# Patient Record
Sex: FEMALE | NOT HISPANIC OR LATINO
[De-identification: names, ages, dates, MRNs, and addresses within clinical notes are randomized per-mention and may not be internally consistent; named-entity substitution may affect disease eponyms.]

---

## 2017-01-30 ENCOUNTER — RECORDS - HEALTHEAST (OUTPATIENT)
Dept: ADMINISTRATIVE | Facility: OTHER | Age: 47
End: 2017-01-30

## 2017-01-30 ENCOUNTER — HOSPITAL ENCOUNTER (EMERGENCY)
Facility: CLINIC | Age: 47
Discharge: HOME | End: 2017-01-30
Attending: PSYCHIATRY & NEUROLOGY | Admitting: PSYCHIATRY & NEUROLOGY
Payer: COMMERCIAL

## 2017-01-30 VITALS
HEART RATE: 68 BPM | SYSTOLIC BLOOD PRESSURE: 106 MMHG | BODY MASS INDEX: 25.82 KG/M2 | RESPIRATION RATE: 16 BRPM | WEIGHT: 132.19 LBS | TEMPERATURE: 97 F | DIASTOLIC BLOOD PRESSURE: 49 MMHG | OXYGEN SATURATION: 99 %

## 2017-01-30 DIAGNOSIS — F42.9 OCD (OBSESSIVE COMPULSIVE DISORDER): ICD-10-CM

## 2017-01-30 LAB
AMPHETAMINES UR QL SCN: NORMAL
BARBITURATES UR QL: NORMAL
BENZODIAZ UR QL: NORMAL
CANNABINOIDS UR QL SCN: NORMAL
COCAINE UR QL: NORMAL
ETHANOL UR QL SCN: NORMAL
HCG UR QL: NEGATIVE
OPIATES UR QL SCN: NORMAL

## 2017-01-30 PROCEDURE — 99284 EMERGENCY DEPT VISIT MOD MDM: CPT | Mod: Z6 | Performed by: PSYCHIATRY & NEUROLOGY

## 2017-01-30 PROCEDURE — 81025 URINE PREGNANCY TEST: CPT | Performed by: EMERGENCY MEDICINE

## 2017-01-30 PROCEDURE — 80307 DRUG TEST PRSMV CHEM ANLYZR: CPT | Performed by: EMERGENCY MEDICINE

## 2017-01-30 PROCEDURE — 90791 PSYCH DIAGNOSTIC EVALUATION: CPT

## 2017-01-30 PROCEDURE — 80320 DRUG SCREEN QUANTALCOHOLS: CPT | Performed by: EMERGENCY MEDICINE

## 2017-01-30 PROCEDURE — 99285 EMERGENCY DEPT VISIT HI MDM: CPT | Mod: 25 | Performed by: PSYCHIATRY & NEUROLOGY

## 2017-01-30 ASSESSMENT — ENCOUNTER SYMPTOMS
HALLUCINATIONS: 0
DYSPHORIC MOOD: 0
CHEST TIGHTNESS: 0
BACK PAIN: 0
FEVER: 0
ABDOMINAL PAIN: 0
SHORTNESS OF BREATH: 0
NERVOUS/ANXIOUS: 1
DIZZINESS: 0

## 2017-01-30 NOTE — ED PROVIDER NOTES
"  History     Chief Complaint   Patient presents with     Suicidal     States she saw theraqpist today.  Had suicidal ideation over the weekend.  States she is not feeling this way currently but is unable to say she will not start feeling this way.      The history is provided by the patient and medical records.     Xena Ribera is a 46 year old female who comes in due to her therapist telling her she should get evaluated due to her having suicidal thoughts over the weekend.  She only had passive thoughts never with any plans or intent.  There were several factors including her menstrual cycle starting and stress about her thesis paper.  She has working hard on her new diagnosis of OCD.  She has been in therapy for it since December and this weekend all of this just \"hit me.\"  She is not suicidal currently.  She states she has good friend and family support including her .  She is not interested in medications.    Please see the 's assessment for further details.    I have reviewed the Medications, Allergies, Past Medical and Surgical History, and Social History in the Epic system.    Review of Systems   Constitutional: Negative for fever.   Eyes: Negative for visual disturbance.   Respiratory: Negative for chest tightness and shortness of breath.    Cardiovascular: Negative for chest pain.   Gastrointestinal: Negative for abdominal pain.   Musculoskeletal: Negative for back pain.   Neurological: Negative for dizziness.   Psychiatric/Behavioral: Negative for suicidal ideas (had thoughts over the weekend), hallucinations, self-injury and dysphoric mood. The patient is nervous/anxious.    All other systems reviewed and are negative.      Physical Exam   BP: 137/57 mmHg  Pulse: 71  Temp: 98.2  F (36.8  C)  Resp: 14  Weight: 59.96 kg (132 lb 3 oz)  SpO2: 99 %  Physical Exam   Constitutional: She is oriented to person, place, and time. She appears well-developed and well-nourished.   HENT:   Head: " Normocephalic and atraumatic.   Mouth/Throat: Oropharynx is clear and moist.   Eyes: Pupils are equal, round, and reactive to light.   Neck: Normal range of motion. Neck supple.   Cardiovascular: Normal rate, regular rhythm and normal heart sounds.    Pulmonary/Chest: Effort normal and breath sounds normal.   Abdominal: Soft. Bowel sounds are normal.   Musculoskeletal: Normal range of motion.   Neurological: She is alert and oriented to person, place, and time.   Skin: Skin is warm and dry.   Psychiatric: She has a normal mood and affect. Her speech is normal and behavior is normal. Judgment and thought content normal. She is not actively hallucinating. Thought content is not paranoid and not delusional. Cognition and memory are normal. She expresses no homicidal and no suicidal ideation. She expresses no suicidal plans and no homicidal plans.   Xena is a 45 y/o female who looks her age.  She is well groomed with good eye contact.   Nursing note and vitals reviewed.      ED Course     Procedures               Labs Ordered and Resulted from Time of ED Arrival Up to the Time of Departure from the ED   DRUG ABUSE SCREEN 6 CHEM DEP URINE ([facility])   HCG QUALITATIVE URINE       Assessments & Plan (with Medical Decision Making)   Xena will be discharged home.  She is not an imminent risk to herself or others.  She will follow up with her therapist.  She was given resources on [employer] and a [facility] OCD support group.  She was also given the info on [facility] crisis team.    I have reviewed the nursing notes.    I have reviewed the findings, diagnosis, plan and need for follow up with the patient.    New Prescriptions    No medications on file       Final diagnoses:   OCD (obsessive compulsive disorder)       1/30/2017   [facility] [place], EMERGENCY DEPARTMENT      Russ Daugherty MD  01/30/17 [id number]

## 2017-01-30 NOTE — ED AVS SNAPSHOT
[facility], [place], Emergency Department    [address]    [facility] [address]    Phone:  [phone]    Fax:  [phone]                                       Xena Ribera   MRN: [id number]    Department:  [facility], Emergency Department   Date of Visit:  1/30/2017           After Visit Summary Signature Page     I have received my discharge instructions, and my questions have been answered. I have discussed any challenges I see with this plan with the nurse or doctor.    ..........................................................................................................................................  Patient/Patient Representative Signature      ..........................................................................................................................................  Patient Representative Print Name and Relationship to Patient    ..................................................               ................................................  Date                                            Time    ..........................................................................................................................................  Reviewed by Signature/Title    ...................................................              ..............................................  Date                                                            Time

## 2017-01-30 NOTE — ED AVS SNAPSHOT
[facility], Emergency Department    [address]    [place] [address]    Phone:  [phone]    Fax:  [phone]                                       Xena Ribera   MRN: [id number]    Department:  [facility], Emergency Department   Date of Visit:  1/30/2017           Patient Information     Date Of Birth          1970        Your diagnoses for this visit were:     OCD (obsessive compulsive disorder)        You were seen by Russ Daugherty MD.        Discharge Instructions       Follow up with your therapist    Use the resources given for further support as needed    24 Hour Appointment Hotline       To make an appointment at any [facility], call [phone] ([phone]). If you don't have a family doctor or clinic, we will help you find one. [facility] clinics are conveniently located to serve the needs of you and your family.             Review of your medicines      Notice     You have not been prescribed any medications.            Procedures and tests performed during your visit     Drug abuse screen 6 urine (tox)    HCG qualitative urine      Orders Needing Specimen Collection     None      Pending Results     No orders found from 1/29/2017 to 1/31/2017.            Pending Culture Results     No orders found from 1/29/2017 to 1/31/2017.            Thank you for choosing [facility]       Thank you for choosing [facility] for your care. Our goal is always to provide you with excellent care. Hearing back from our patients is one way we can continue to improve our services. Please take a few minutes to complete the written survey that you may receive in the mail after you visit with us. Thank you!        [employer] Information     [employer] gives you secure access to your electronic health record. If you see a primary care provider, you can also send messages to your care team and make appointments. If you have questions, please call your primary care clinic.  If you do not have a  primary care provider, please call [phone] and they will assist you.        Care EveryWhere ID     This is your Care EveryWhere ID. This could be used by other organizations to access your [place] medical records  [id number]        After Visit Summary       This is your record. Keep this with you and show to your community pharmacist(s) and doctor(s) at your next visit.

## 2017-04-06 ENCOUNTER — COMMUNICATION - HEALTHEAST (OUTPATIENT)
Dept: FAMILY MEDICINE | Facility: CLINIC | Age: 47
End: 2017-04-06

## 2017-04-28 ENCOUNTER — OFFICE VISIT - HEALTHEAST (OUTPATIENT)
Dept: FAMILY MEDICINE | Facility: CLINIC | Age: 47
End: 2017-04-28

## 2017-04-28 DIAGNOSIS — M54.2 NECK PAIN: ICD-10-CM

## 2017-04-28 DIAGNOSIS — R06.02 SHORTNESS OF BREATH: ICD-10-CM

## 2017-04-28 ASSESSMENT — MIFFLIN-ST. JEOR: SCORE: 1144.89

## 2017-07-15 ENCOUNTER — HEALTH MAINTENANCE LETTER (OUTPATIENT)
Age: 47
End: 2017-07-15

## 2018-02-12 ENCOUNTER — COMMUNICATION - HEALTHEAST (OUTPATIENT)
Dept: TELEHEALTH | Facility: CLINIC | Age: 48
End: 2018-02-12

## 2018-02-12 ENCOUNTER — OFFICE VISIT - HEALTHEAST (OUTPATIENT)
Dept: FAMILY MEDICINE | Facility: CLINIC | Age: 48
End: 2018-02-12

## 2018-02-12 DIAGNOSIS — B37.31 YEAST VAGINITIS: ICD-10-CM

## 2018-02-12 LAB
CLUE CELLS: ABNORMAL
TRICHOMONAS, WET PREP: ABNORMAL
YEAST, WET PREP: ABNORMAL

## 2018-02-14 ENCOUNTER — COMMUNICATION - HEALTHEAST (OUTPATIENT)
Dept: SCHEDULING | Facility: CLINIC | Age: 48
End: 2018-02-14

## 2018-08-07 ENCOUNTER — OFFICE VISIT - HEALTHEAST (OUTPATIENT)
Dept: OTOLARYNGOLOGY | Facility: CLINIC | Age: 48
End: 2018-08-07

## 2018-08-07 DIAGNOSIS — J34.89 NASAL VESTIBULITIS: ICD-10-CM

## 2018-08-24 ENCOUNTER — COMMUNICATION - HEALTHEAST (OUTPATIENT)
Dept: FAMILY MEDICINE | Facility: CLINIC | Age: 48
End: 2018-08-24

## 2018-08-24 DIAGNOSIS — N84.1 POLYP OF CERVIX: ICD-10-CM

## 2018-09-10 ENCOUNTER — COMMUNICATION - HEALTHEAST (OUTPATIENT)
Dept: ADMINISTRATIVE | Facility: CLINIC | Age: 48
End: 2018-09-10

## 2018-09-13 ENCOUNTER — OFFICE VISIT - HEALTHEAST (OUTPATIENT)
Dept: OBGYN | Facility: CLINIC | Age: 48
End: 2018-09-13

## 2018-09-13 DIAGNOSIS — N84.1 POLYP, CERVIX: ICD-10-CM

## 2018-09-13 ASSESSMENT — MIFFLIN-ST. JEOR: SCORE: 1181.17

## 2018-09-17 LAB
LAB AP CHARGES (HE HISTORICAL CONVERSION): NORMAL
PATH REPORT.COMMENTS IMP SPEC: NORMAL
PATH REPORT.FINAL DX SPEC: NORMAL
PATH REPORT.GROSS SPEC: NORMAL
PATH REPORT.MICROSCOPIC SPEC OTHER STN: NORMAL
PATH REPORT.RELEVANT HX SPEC: NORMAL
RESULT FLAG (HE HISTORICAL CONVERSION): NORMAL

## 2019-11-03 ENCOUNTER — HEALTH MAINTENANCE LETTER (OUTPATIENT)
Age: 49
End: 2019-11-03

## 2019-12-04 ENCOUNTER — COMMUNICATION - HEALTHEAST (OUTPATIENT)
Dept: FAMILY MEDICINE | Facility: CLINIC | Age: 49
End: 2019-12-04

## 2019-12-13 ENCOUNTER — OFFICE VISIT - HEALTHEAST (OUTPATIENT)
Dept: FAMILY MEDICINE | Facility: CLINIC | Age: 49
End: 2019-12-13

## 2019-12-13 DIAGNOSIS — E55.9 VITAMIN D DEFICIENCY: ICD-10-CM

## 2019-12-13 DIAGNOSIS — M25.50 ARTHRALGIA, UNSPECIFIED JOINT: ICD-10-CM

## 2019-12-13 DIAGNOSIS — Z12.11 SCREEN FOR COLON CANCER: ICD-10-CM

## 2019-12-13 DIAGNOSIS — Z12.31 VISIT FOR SCREENING MAMMOGRAM: ICD-10-CM

## 2019-12-13 LAB
ALBUMIN SERPL-MCNC: 3.8 G/DL (ref 3.5–5)
ALP SERPL-CCNC: 89 U/L (ref 45–120)
ALT SERPL W P-5'-P-CCNC: 13 U/L (ref 0–45)
ANION GAP SERPL CALCULATED.3IONS-SCNC: 11 MMOL/L (ref 5–18)
AST SERPL W P-5'-P-CCNC: 17 U/L (ref 0–40)
BILIRUB SERPL-MCNC: 0.2 MG/DL (ref 0–1)
BUN SERPL-MCNC: 12 MG/DL (ref 8–22)
C REACTIVE PROTEIN LHE: 0.2 MG/DL (ref 0–0.8)
CALCIUM SERPL-MCNC: 9.4 MG/DL (ref 8.5–10.5)
CHLORIDE BLD-SCNC: 104 MMOL/L (ref 98–107)
CO2 SERPL-SCNC: 26 MMOL/L (ref 22–31)
CREAT SERPL-MCNC: 0.69 MG/DL (ref 0.6–1.1)
ERYTHROCYTE [DISTWIDTH] IN BLOOD BY AUTOMATED COUNT: 11.3 % (ref 11–14.5)
ERYTHROCYTE [SEDIMENTATION RATE] IN BLOOD BY WESTERGREN METHOD: 4 MM/HR (ref 0–20)
GFR SERPL CREATININE-BSD FRML MDRD: >60 ML/MIN/1.73M2
GLUCOSE BLD-MCNC: 83 MG/DL (ref 70–125)
HCT VFR BLD AUTO: 42.9 % (ref 35–47)
HGB BLD-MCNC: 14.3 G/DL (ref 12–16)
MCH RBC QN AUTO: 31.1 PG (ref 27–34)
MCHC RBC AUTO-ENTMCNC: 33.4 G/DL (ref 32–36)
MCV RBC AUTO: 93 FL (ref 80–100)
PLATELET # BLD AUTO: 219 THOU/UL (ref 140–440)
PMV BLD AUTO: 8.1 FL (ref 7–10)
POTASSIUM BLD-SCNC: 3.9 MMOL/L (ref 3.5–5)
PROT SERPL-MCNC: 6.7 G/DL (ref 6–8)
RBC # BLD AUTO: 4.61 MILL/UL (ref 3.8–5.4)
RHEUMATOID FACT SERPL-ACNC: <15 IU/ML (ref 0–30)
SODIUM SERPL-SCNC: 141 MMOL/L (ref 136–145)
WBC: 4.8 THOU/UL (ref 4–11)

## 2019-12-15 ENCOUNTER — COMMUNICATION - HEALTHEAST (OUTPATIENT)
Dept: FAMILY MEDICINE | Facility: CLINIC | Age: 49
End: 2019-12-15

## 2019-12-16 LAB — 25(OH)D3 SERPL-MCNC: 9.4 NG/ML (ref 30–80)

## 2019-12-17 ENCOUNTER — COMMUNICATION - HEALTHEAST (OUTPATIENT)
Dept: FAMILY MEDICINE | Facility: CLINIC | Age: 49
End: 2019-12-17

## 2019-12-17 DIAGNOSIS — E55.9 VITAMIN D DEFICIENCY: ICD-10-CM

## 2020-01-06 ENCOUNTER — COMMUNICATION - HEALTHEAST (OUTPATIENT)
Dept: FAMILY MEDICINE | Facility: CLINIC | Age: 50
End: 2020-01-06

## 2020-02-21 ENCOUNTER — OFFICE VISIT - HEALTHEAST (OUTPATIENT)
Dept: FAMILY MEDICINE | Facility: CLINIC | Age: 50
End: 2020-02-21

## 2020-02-21 DIAGNOSIS — M54.50 BILATERAL LOW BACK PAIN, UNSPECIFIED CHRONICITY, UNSPECIFIED WHETHER SCIATICA PRESENT: ICD-10-CM

## 2020-02-21 DIAGNOSIS — E55.9 VITAMIN D DEFICIENCY: ICD-10-CM

## 2020-02-21 ASSESSMENT — MIFFLIN-ST. JEOR: SCORE: 1160.76

## 2020-02-24 LAB
25(OH)D3 SERPL-MCNC: 46.8 NG/ML (ref 30–80)
25(OH)D3 SERPL-MCNC: 46.8 NG/ML (ref 30–80)

## 2020-05-27 ENCOUNTER — COMMUNICATION - HEALTHEAST (OUTPATIENT)
Dept: SCHEDULING | Facility: CLINIC | Age: 50
End: 2020-05-27

## 2020-05-29 ENCOUNTER — OFFICE VISIT - HEALTHEAST (OUTPATIENT)
Dept: FAMILY MEDICINE | Facility: CLINIC | Age: 50
End: 2020-05-29

## 2020-05-29 DIAGNOSIS — S70.11XA CONTUSION OF RIGHT THIGH, INITIAL ENCOUNTER: ICD-10-CM

## 2020-06-30 ENCOUNTER — OFFICE VISIT - HEALTHEAST (OUTPATIENT)
Dept: FAMILY MEDICINE | Facility: CLINIC | Age: 50
End: 2020-06-30

## 2020-06-30 DIAGNOSIS — Z13.220 LIPID SCREENING: ICD-10-CM

## 2020-06-30 DIAGNOSIS — L72.9 CYST OF SKIN: ICD-10-CM

## 2020-06-30 DIAGNOSIS — Z12.4 SCREENING FOR MALIGNANT NEOPLASM OF CERVIX: ICD-10-CM

## 2020-06-30 DIAGNOSIS — Z12.11 SCREENING FOR COLON CANCER: ICD-10-CM

## 2020-06-30 LAB
CHOLEST SERPL-MCNC: 218 MG/DL
FASTING STATUS PATIENT QL REPORTED: NO
HDLC SERPL-MCNC: 87 MG/DL
LDLC SERPL CALC-MCNC: 122 MG/DL
TRIGL SERPL-MCNC: 47 MG/DL

## 2020-06-30 ASSESSMENT — MIFFLIN-ST. JEOR: SCORE: 1104.06

## 2020-07-01 LAB
HPV SOURCE: NORMAL
HUMAN PAPILLOMA VIRUS 16 DNA: NEGATIVE
HUMAN PAPILLOMA VIRUS 18 DNA: NEGATIVE
HUMAN PAPILLOMA VIRUS FINAL DIAGNOSIS: NORMAL
HUMAN PAPILLOMA VIRUS OTHER HR: NEGATIVE
SPECIMEN DESCRIPTION: NORMAL

## 2020-07-29 ENCOUNTER — RECORDS - HEALTHEAST (OUTPATIENT)
Dept: ADMINISTRATIVE | Facility: OTHER | Age: 50
End: 2020-07-29

## 2020-07-29 LAB — COLOGUARD-ABSTRACT: POSITIVE

## 2020-08-14 ENCOUNTER — COMMUNICATION - HEALTHEAST (OUTPATIENT)
Dept: FAMILY MEDICINE | Facility: CLINIC | Age: 50
End: 2020-08-14

## 2020-08-14 DIAGNOSIS — Z12.11 SCREENING FOR COLON CANCER: ICD-10-CM

## 2020-08-21 ENCOUNTER — RECORDS - HEALTHEAST (OUTPATIENT)
Dept: HEALTH INFORMATION MANAGEMENT | Facility: CLINIC | Age: 50
End: 2020-08-21

## 2020-09-11 ENCOUNTER — OFFICE VISIT - HEALTHEAST (OUTPATIENT)
Dept: OTOLARYNGOLOGY | Facility: CLINIC | Age: 50
End: 2020-09-11

## 2020-09-11 DIAGNOSIS — J34.89 NASAL VESTIBULITIS: ICD-10-CM

## 2020-09-11 DIAGNOSIS — H91.92 HEARING LOSS OF LEFT EAR, UNSPECIFIED HEARING LOSS TYPE: ICD-10-CM

## 2020-09-25 ENCOUNTER — RECORDS - HEALTHEAST (OUTPATIENT)
Dept: ADMINISTRATIVE | Facility: OTHER | Age: 50
End: 2020-09-25

## 2020-10-07 ENCOUNTER — OFFICE VISIT - HEALTHEAST (OUTPATIENT)
Dept: AUDIOLOGY | Facility: CLINIC | Age: 50
End: 2020-10-07

## 2020-10-07 ENCOUNTER — TRANSFERRED RECORDS (OUTPATIENT)
Dept: HEALTH INFORMATION MANAGEMENT | Facility: CLINIC | Age: 50
End: 2020-10-07

## 2020-10-07 ENCOUNTER — OFFICE VISIT - HEALTHEAST (OUTPATIENT)
Dept: OTOLARYNGOLOGY | Facility: CLINIC | Age: 50
End: 2020-10-07

## 2020-10-07 DIAGNOSIS — H90.3 SNHL (SENSORY-NEURAL HEARING LOSS), ASYMMETRICAL: ICD-10-CM

## 2020-10-07 DIAGNOSIS — H90.42 SENSORINEURAL HEARING LOSS (SNHL) OF LEFT EAR WITH UNRESTRICTED HEARING OF RIGHT EAR: ICD-10-CM

## 2020-10-12 ENCOUNTER — HOSPITAL ENCOUNTER (OUTPATIENT)
Dept: MAMMOGRAPHY | Facility: CLINIC | Age: 50
Discharge: HOME | End: 2020-10-12
Attending: FAMILY MEDICINE

## 2020-10-12 DIAGNOSIS — Z12.31 VISIT FOR SCREENING MAMMOGRAM: ICD-10-CM

## 2020-11-10 ENCOUNTER — HOSPITAL ENCOUNTER (OUTPATIENT)
Dept: MRI IMAGING | Facility: HOSPITAL | Age: 50
Discharge: HOME | End: 2020-11-10
Attending: OTOLARYNGOLOGY

## 2020-11-10 DIAGNOSIS — H90.3 SNHL (SENSORY-NEURAL HEARING LOSS), ASYMMETRICAL: ICD-10-CM

## 2020-11-11 ENCOUNTER — COMMUNICATION - HEALTHEAST (OUTPATIENT)
Dept: OTOLARYNGOLOGY | Facility: CLINIC | Age: 50
End: 2020-11-11

## 2020-11-16 ENCOUNTER — HEALTH MAINTENANCE LETTER (OUTPATIENT)
Age: 50
End: 2020-11-16

## 2020-11-16 ENCOUNTER — COMMUNICATION - HEALTHEAST (OUTPATIENT)
Dept: OTOLARYNGOLOGY | Facility: CLINIC | Age: 50
End: 2020-11-16

## 2021-02-01 ENCOUNTER — AMBULATORY - HEALTHEAST (OUTPATIENT)
Dept: NURSING | Facility: CLINIC | Age: 51
End: 2021-02-01

## 2021-02-07 ENCOUNTER — HEALTH MAINTENANCE LETTER (OUTPATIENT)
Age: 51
End: 2021-02-07

## 2021-02-22 ENCOUNTER — AMBULATORY - HEALTHEAST (OUTPATIENT)
Dept: NURSING | Facility: CLINIC | Age: 51
End: 2021-02-22

## 2021-04-05 ENCOUNTER — OFFICE VISIT - HEALTHEAST (OUTPATIENT)
Dept: FAMILY MEDICINE | Facility: CLINIC | Age: 51
End: 2021-04-05

## 2021-04-05 ENCOUNTER — MEDICAL CORRESPONDENCE (OUTPATIENT)
Dept: HEALTH INFORMATION MANAGEMENT | Facility: CLINIC | Age: 51
End: 2021-04-05

## 2021-04-05 DIAGNOSIS — H90.5 UNILATERAL SENSORINEURAL HEARING LOSS: ICD-10-CM
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
## 2021-04-06 ENCOUNTER — TRANSCRIBE ORDERS (OUTPATIENT)
Dept: OTHER | Age: 51
End: 2021-04-06

## 2021-04-06 DIAGNOSIS — H90.5 UNILATERAL SENSORINEURAL HEARING LOSS: Primary | ICD-10-CM

## 2021-04-07 NOTE — TELEPHONE ENCOUNTER
FUTURE VISIT INFORMATION      FUTURE VISIT INFORMATION:    Date: 6/1/2021    Time: 4PM    Location: [facility]  REFERRAL INFORMATION:    Referring provider:  Shelia Montanez CNP      Referring providers clinic:  [facility]      Reason for visit/diagnosis  unilateral hearing loss, Referred by Shelia Montanez at [facility] - sched per pt    RECORDS REQUESTED FROM:       Clinic name Comments Records Status Imaging Status   [facility]   4/5/2021 referral and note from Shelia Montanez CNP   Care everywhere     [facility] audiology  10/7/2020 audiogram from [facility] req 4/7/2021, 5/7/21 - received     [facility]   10/7/2020 note from Dr. Micheline Stewart Care everywhere     [facility] imaging  11/10/2020 MR brain  Care everywhere  req 4/7/2021, 5/7/2021 - PACS                 4/7/2021 10:52AM sent a fax to [facility] for audio and images - Amay   5/7/2021 7:56AM sent a 2nd fax to [facility] for audio and images - Amay   *audio from [facility] sent to scan and images in PACS - Amay

## 2021-05-18 ENCOUNTER — DOCUMENTATION ONLY (OUTPATIENT)
Dept: CARE COORDINATION | Facility: CLINIC | Age: 51
End: 2021-05-18

## 2021-05-28 DIAGNOSIS — H90.3 SNHL (SENSORY-NEURAL HEARING LOSS), ASYMMETRICAL: Primary | ICD-10-CM

## 2021-05-28 NOTE — PATIENT INSTRUCTIONS
1. You were seen in the ENT Clinic today by Dr. Nissen.  If you have any questions or concerns after your appointment, please call   - Option 1: ENT Clinic: [phone]   - Option 2: Jamilah (Dr. Nissen's Nurse): [phone]    2.   Plan to return to clinic in 1 year with hearing test      Jamilah Santiago LPN  [facility] - Otolaryngology     73.8

## 2021-05-30 VITALS — HEIGHT: 60 IN | BODY MASS INDEX: 26.5 KG/M2 | WEIGHT: 135 LBS

## 2021-05-31 VITALS — BODY MASS INDEX: 27.18 KG/M2 | WEIGHT: 138 LBS

## 2021-06-01 ENCOUNTER — OFFICE VISIT (OUTPATIENT)
Dept: OTOLARYNGOLOGY | Facility: CLINIC | Age: 51
End: 2021-06-01
Attending: NURSE PRACTITIONER
Payer: COMMERCIAL

## 2021-06-01 ENCOUNTER — OFFICE VISIT (OUTPATIENT)
Dept: AUDIOLOGY | Facility: CLINIC | Age: 51
End: 2021-06-01
Attending: NURSE PRACTITIONER
Payer: COMMERCIAL

## 2021-06-01 ENCOUNTER — RECORDS - HEALTHEAST (OUTPATIENT)
Dept: ADMINISTRATIVE | Facility: OTHER | Age: 51
End: 2021-06-01

## 2021-06-01 ENCOUNTER — PRE VISIT (OUTPATIENT)
Dept: OTOLARYNGOLOGY | Facility: CLINIC | Age: 51
End: 2021-06-01

## 2021-06-01 VITALS
TEMPERATURE: 99.3 F | WEIGHT: 119.05 LBS | OXYGEN SATURATION: 98 % | BODY MASS INDEX: 24 KG/M2 | RESPIRATION RATE: 16 BRPM | HEART RATE: 65 BPM | HEIGHT: 59 IN

## 2021-06-01 VITALS — BODY MASS INDEX: 28.07 KG/M2 | WEIGHT: 143 LBS | HEIGHT: 60 IN

## 2021-06-01 DIAGNOSIS — H90.12 CONDUCTIVE HEARING LOSS OF LEFT EAR WITH UNRESTRICTED HEARING OF RIGHT EAR: Primary | ICD-10-CM

## 2021-06-01 DIAGNOSIS — H61.23 EXCESSIVE CERUMEN IN BOTH EAR CANALS: ICD-10-CM

## 2021-06-01 DIAGNOSIS — H93.12 TINNITUS, LEFT: ICD-10-CM

## 2021-06-01 DIAGNOSIS — H90.3 SNHL (SENSORY-NEURAL HEARING LOSS), ASYMMETRICAL: ICD-10-CM

## 2021-06-01 DIAGNOSIS — H90.3 SNHL (SENSORY-NEURAL HEARING LOSS), ASYMMETRICAL: Primary | ICD-10-CM

## 2021-06-01 PROCEDURE — 92550 TYMPANOMETRY & REFLEX THRESH: CPT | Performed by: AUDIOLOGIST

## 2021-06-01 PROCEDURE — 92557 COMPREHENSIVE HEARING TEST: CPT | Performed by: AUDIOLOGIST

## 2021-06-01 PROCEDURE — 92504 EAR MICROSCOPY EXAMINATION: CPT | Performed by: OTOLARYNGOLOGY

## 2021-06-01 PROCEDURE — 99243 OFF/OP CNSLTJ NEW/EST LOW 30: CPT | Mod: 25 | Performed by: OTOLARYNGOLOGY

## 2021-06-01 ASSESSMENT — MIFFLIN-ST. JEOR: SCORE: 1060.63

## 2021-06-01 ASSESSMENT — PAIN SCALES - GENERAL: PAINLEVEL: NO PAIN (0)

## 2021-06-01 NOTE — LETTER
2021       RE: Xena Ribera  [address] [place] [address]     Dear Colleague,    Thank you for referring your patient, Xena Ribera, to the [facility] EAR NOSE AND THROAT CLINIC [place] at [facility]. Please see a copy of my visit note below.    Dear Radha Harris:    I had the pleasure of meeting Xena Ribera in consultation today at the [facility] Otolaryngology Clinic at your request.    CHIEF COMPLAINT: Left asymmetrical sensorineural hearing loss    HISTORY OF PRESENT ILLNESS: Patient is a 50-year-old in today for assessment of her left hearing loss and second opinion.  She noticed the left hearing was down about a year ago.  Not really aware if it was a sudden loss of just slowly progressive but just sort of noticed that.  Also noticed left tinnitus, that has been more intermittent but still present.  She did not have any dizziness or balance concerns, no episodes of vertigo.  She did see ENT physician and had an MRI scan with gadolinium which was negative.  She denies any dysphagia, hoarseness, facial paresthesias.  She has 1 sister who has been deaf since birth.  Is not interested in cochlear implant gets along with sign language.  She feels she gets along fairly well and just interested in second opinion.    ALLERGIES:  No Known Allergies    HABITS: Social History    Substance and Sexual Activity      Alcohol use: No     History   Smoking Status     Never Smoker   Smokeless Tobacco     Never Used         PAST MEDICAL HISTORY: Please see today's intake form (for the remainder of the PMH) which I reviewed and signed.  Past Medical History:   Diagnosis Date     NO ACTIVE PROBLEMS      OCD (obsessive compulsive disorder)        FAMILY HISTORY/SOCIAL HISTORY:   Family History   Problem Relation Age of Onset     Diabetes Father          59 of MI     Alzheimer Disease Maternal Grandmother         late  onset     Arthritis Mother         osteo     Arthritis Maternal Grandmother      Gastrointestinal Disease Father         PUD     Heart Disease Father         MI age 59     Heart Disease Maternal Grandfather         MI 72     Cancer Paternal Grandmother         skin - not life threatening      Social History     Socioeconomic History     Marital status:      Spouse name: Not on file     Number of children: Not on file     Years of education: Not on file     Highest education level: Not on file   Occupational History     Not on file   Social Needs     Financial resource strain: Not on file     Food insecurity     Worry: Not on file     Inability: Not on file     Transportation needs     Medical: Not on file     Non-medical: Not on file   Tobacco Use     Smoking status: Never Smoker     Smokeless tobacco: Never Used   Substance and Sexual Activity     Alcohol use: No     Drug use: No     Sexual activity: Yes     Partners: Male   Lifestyle     Physical activity     Days per week: Not on file     Minutes per session: Not on file     Stress: Not on file   Relationships     Social connections     Talks on phone: Not on file     Gets together: Not on file     Attends [employer] service: Not on file     Active member of club or organization: Not on file     Attends meetings of clubs or organizations: Not on file     Relationship status: Not on file     Intimate partner violence     Fear of current or ex partner: Not on file     Emotionally abused: Not on file     Physically abused: Not on file     Forced sexual activity: Not on file   Other Topics Concern     Parent/sibling w/ CABG, MI or angioplasty before 65F 55M? No   Social History Narrative    Balanced Diet - Yes    Osteoporosis Prevention Measures - Dairy servings per day: none    Regular Exercise -  No Describe no    Dental Exam - NO    Eye Exam - NO    Self Breast Exam - No    Abuse: Current or Past (Physical, Sexual or Emotional)- No    Do you feel safe in  your environment - Yes    Guns stored in the home - No    Sunscreen used - Yes    Seatbelts used - Yes    Lipids -  NO    Glucose -  NO    Colon Cancer Screening - No    Hemoccults - NO    Pap Test -  NO    Do you have any concerns about STD's -  No    Mammography - NO    DEXA - NO    Immunizations reviewed and up to date - Yes 2000.Bradley Paiz MA                   REVIEW OF SYSTEMS: [unfilled]    The remainder of the 10 point ROS is negative    PHYSICIAL EXAMINATION:  Constitutional: The patient was well-groomed and in no acute distress.   Skin: Warm and pink.  Psychiatric: The patient's affect was calm, cooperative, and appropriate.   Respiratory: Breathing comfortably without stridor or exertion of accessory muscles.  Eyes: Pupils were equal and reactive. Extraocular movement intact.   Head: Normocephalic and atraumatic. No lesions or scars.  Ears: Patient placed under the microscope for microscopic evaluation and cleaning of cerumen which was obscuring full visualization and complete assessment of both TMs. Under high power magnification, the right ear was examined and cleaned of cerumen using curet, alligator forceps, and suction.  After cleaning, TM is fully visualized and has normal position with normal middle ear aeration. The left ear was then cleaned and inspected using microscope, instruments and similar techniques. After cleaning of cerumen, the TM has normal position with normal aeration to middle ear.  Nose: Sinuses were nontender. Anterior rhinoscopy revealed midline septum and absence of purulence or polyps.  Oral Cavity: Normal tongue, floor of mouth, buccal mucosa, and palate. No lesions or masses on inspection or palpation. No abnormal lymph tissue in the oropharynx.   Neck: The parotid is soft without masses. Supple with normal laryngeal and tracheal landmarks.   Lymphatic: There is no palpable lymphadenopathy or other masses in the neck.   Neurologic: Alert and oriented x 3. Cranial nerves  III-XI within normal limits. Voice quality normal.  Cerebellar Function Tests:  Grossly normal    Audiogram: Audiogram performed shows normal hearing in the right ear.  Left ear shows a high-frequency sensorineural hearing loss mild to moderate downhill sloping.  She has good discrimination at 100% in each ear.  Normal type A tympanograms bilaterally.      IMPRESSION AND PLAN:   1. Left asymmetrical high-frequency sensorineural hearing loss: Discussed this with her in detail.  Reviewed with her previous diagnosis of left sudden sensorineural hearing loss syndrome.  Discussed this in detail, answered multiple questions.  Recommend we monitor both the left and right hearing with follow-up in 1 year.  She understands importance of this and will send her a card as a reminder.  2. Left tinnitus: Secondary sensory hearing loss, no treatment needed, monitor.  3. Excessive cerumen: Cleaned today, no further treatment needed, monitor.    Thank you very much for the opportunity to participate in the care of your patient.    Rick L Nissen MD            Again, thank you for allowing me to participate in the care of your patient.      Sincerely,    Rick L. Nissen, MD

## 2021-06-01 NOTE — PROGRESS NOTES
Dear Radha Harris:    I had the pleasure of meeting Xena Ribera in consultation today at the [facility] Otolaryngology Clinic at your request.    CHIEF COMPLAINT: Left asymmetrical sensorineural hearing loss    HISTORY OF PRESENT ILLNESS: Patient is a 50-year-old in today for assessment of her left hearing loss and second opinion.  She noticed the left hearing was down about a year ago.  Not really aware if it was a sudden loss of just slowly progressive but just sort of noticed that.  Also noticed left tinnitus, that has been more intermittent but still present.  She did not have any dizziness or balance concerns, no episodes of vertigo.  She did see ENT physician and had an MRI scan with gadolinium which was negative.  She denies any dysphagia, hoarseness, facial paresthesias.  She has 1 sister who has been deaf since birth.  Is not interested in cochlear implant gets along with sign language.  She feels she gets along fairly well and just interested in second opinion.    ALLERGIES:  No Known Allergies    HABITS: Social History    Substance and Sexual Activity      Alcohol use: No     History   Smoking Status     Never Smoker   Smokeless Tobacco     Never Used         PAST MEDICAL HISTORY: Please see today's intake form (for the remainder of the PMH) which I reviewed and signed.  Past Medical History:   Diagnosis Date     NO ACTIVE PROBLEMS      OCD (obsessive compulsive disorder)        FAMILY HISTORY/SOCIAL HISTORY:   Family History   Problem Relation Age of Onset     Diabetes Father          59 of MI     Alzheimer Disease Maternal Grandmother         late onset     Arthritis Mother         osteo     Arthritis Maternal Grandmother      Gastrointestinal Disease Father         PUD     Heart Disease Father         MI age 59     Heart Disease Maternal Grandfather         MI 72     Cancer Paternal Grandmother         skin - not life threatening      Social History     Socioeconomic  History     Marital status:      Spouse name: Not on file     Number of children: Not on file     Years of education: Not on file     Highest education level: Not on file   Occupational History     Not on file   Social Needs     Financial resource strain: Not on file     Food insecurity     Worry: Not on file     Inability: Not on file     Transportation needs     Medical: Not on file     Non-medical: Not on file   Tobacco Use     Smoking status: Never Smoker     Smokeless tobacco: Never Used   Substance and Sexual Activity     Alcohol use: No     Drug use: No     Sexual activity: Yes     Partners: Male   Lifestyle     Physical activity     Days per week: Not on file     Minutes per session: Not on file     Stress: Not on file   Relationships     Social connections     Talks on phone: Not on file     Gets together: Not on file     Attends Anabaptist service: Not on file     Active member of club or organization: Not on file     Attends meetings of clubs or organizations: Not on file     Relationship status: Not on file     Intimate partner violence     Fear of current or ex partner: Not on file     Emotionally abused: Not on file     Physically abused: Not on file     Forced sexual activity: Not on file   Other Topics Concern     Parent/sibling w/ CABG, MI or angioplasty before 65F 55M? No   Social History Narrative    Balanced Diet - Yes    Osteoporosis Prevention Measures - Dairy servings per day: none    Regular Exercise -  No Describe no    Dental Exam - NO    Eye Exam - NO    Self Breast Exam - No    Abuse: Current or Past (Physical, Sexual or Emotional)- No    Do you feel safe in your environment - Yes    Guns stored in the home - No    Sunscreen used - Yes    Seatbelts used - Yes    Lipids -  NO    Glucose -  NO    Colon Cancer Screening - No    Hemoccults - NO    Pap Test -  NO    Do you have any concerns about STD's -  No    Mammography - NO    DEXA - NO    Immunizations reviewed and up to date - Yes  2000.Bradley Paiz MA                   REVIEW OF SYSTEMS: [unfilled]    The remainder of the 10 point ROS is negative    PHYSICIAL EXAMINATION:  Constitutional: The patient was well-groomed and in no acute distress.   Skin: Warm and pink.  Psychiatric: The patient's affect was calm, cooperative, and appropriate.   Respiratory: Breathing comfortably without stridor or exertion of accessory muscles.  Eyes: Pupils were equal and reactive. Extraocular movement intact.   Head: Normocephalic and atraumatic. No lesions or scars.  Ears: Patient placed under the microscope for microscopic evaluation and cleaning of cerumen which was obscuring full visualization and complete assessment of both TMs. Under high power magnification, the right ear was examined and cleaned of cerumen using curet, alligator forceps, and suction.  After cleaning, TM is fully visualized and has normal position with normal middle ear aeration. The left ear was then cleaned and inspected using microscope, instruments and similar techniques. After cleaning of cerumen, the TM has normal position with normal aeration to middle ear.  Nose: Sinuses were nontender. Anterior rhinoscopy revealed midline septum and absence of purulence or polyps.  Oral Cavity: Normal tongue, floor of mouth, buccal mucosa, and palate. No lesions or masses on inspection or palpation. No abnormal lymph tissue in the oropharynx.   Neck: The parotid is soft without masses. Supple with normal laryngeal and tracheal landmarks.   Lymphatic: There is no palpable lymphadenopathy or other masses in the neck.   Neurologic: Alert and oriented x 3. Cranial nerves III-XI within normal limits. Voice quality normal.  Cerebellar Function Tests:  Grossly normal    Audiogram: Audiogram performed shows normal hearing in the right ear.  Left ear shows a high-frequency sensorineural hearing loss mild to moderate downhill sloping.  She has good discrimination at 100% in each ear.  Normal type A  tympanograms bilaterally.      IMPRESSION AND PLAN:   1. Left asymmetrical high-frequency sensorineural hearing loss: Discussed this with her in detail.  Reviewed with her previous diagnosis of left sudden sensorineural hearing loss syndrome.  Discussed this in detail, answered multiple questions.  Recommend we monitor both the left and right hearing with follow-up in 1 year.  She understands importance of this and will send her a card as a reminder.  2. Left tinnitus: Secondary sensory hearing loss, no treatment needed, monitor.  3. Excessive cerumen: Cleaned today, no further treatment needed, monitor.    Thank you very much for the opportunity to participate in the care of your patient.    Rick L Nissen MD

## 2021-06-01 NOTE — NURSING NOTE
"Chief Complaint   Patient presents with     Consult     hearing loss left ear     Pulse 65, temperature 99.3  F (37.4  C), resp. rate 16, height 1.499 m (4' 11\"), weight 54 kg (119 lb 0.8 oz), SpO2 98 %, not currently breastfeeding.    Uday Cason LPN    "

## 2021-06-01 NOTE — PROGRESS NOTES
AUDIOLOGY REPORT    SUMMARY: Audiology visit completed. See audiogram for results.      RECOMMENDATIONS: Follow-up with ENT.        Divine Nava, CCC-A  Licensed Audiologist  [place] #[id number]

## 2021-06-03 VITALS
BODY MASS INDEX: 27.97 KG/M2 | SYSTOLIC BLOOD PRESSURE: 118 MMHG | DIASTOLIC BLOOD PRESSURE: 72 MMHG | WEIGHT: 142 LBS | OXYGEN SATURATION: 99 % | HEART RATE: 77 BPM

## 2021-06-04 VITALS
WEIGHT: 126 LBS | HEIGHT: 60 IN | DIASTOLIC BLOOD PRESSURE: 61 MMHG | BODY MASS INDEX: 24.74 KG/M2 | SYSTOLIC BLOOD PRESSURE: 111 MMHG | HEART RATE: 74 BPM

## 2021-06-04 VITALS
DIASTOLIC BLOOD PRESSURE: 72 MMHG | SYSTOLIC BLOOD PRESSURE: 102 MMHG | HEIGHT: 60 IN | BODY MASS INDEX: 27.19 KG/M2 | WEIGHT: 138.5 LBS | HEART RATE: 62 BPM | OXYGEN SATURATION: 99 %

## 2021-06-04 VITALS — BODY MASS INDEX: 27.18 KG/M2 | WEIGHT: 138 LBS

## 2021-06-04 NOTE — PROGRESS NOTES
SUBJECTIVE: Xena Ribera is a 49 y.o. female with:  Chief Complaint   Patient presents with     Pain     dull pain in my hands, feet, neck comes and goes since the last month     Dryness in the nostril    1) Pain - She has been having intermittent pain in back of neck / wrists / ankles.  Gets pain in am.  Has soreness and gets better as day goes on.  She will occasionally get pain during the day.  No redness / swelling of joints.  No rashes.  No trouble with fingers/ shoulders/ knees.  Has not taken meds.  No changes in routine.  Has had increased stress at home.  No changes in diet.  Had few days loose stools.  Going on for couple months- staying same.  Sleeping ok.    2) Has some dryness in nostrils.  Has some soreness in both nostrils.  Years ago used antibiotic ointment for this and it helped.    SH: .  Patient Active Problem List   Diagnosis     Polyp of cervix     Low back pain     Obsessive-compulsive disorder     OBJECTIVE: /72 (Patient Site: Right Arm, Patient Position: Sitting, Cuff Size: Adult Regular)   Pulse 77   Wt 142 lb (64.4 kg)   SpO2 99%   BMI 27.97 kg/m   no distress  Neck: No c-spine tenderness. Good range of motion.  lower extremities: Hands/ fingers/ wrist - no redness/ swelling.  Good range of motion.   Neuro: AAOx3.  Normal strength and tone.  Normal gait.    Xena was seen today for pain and dryness in the nostril.    Diagnoses and all orders for this visit:    Arthralgia, unspecified joint-  Will evaluate for inflammation/ Rheumatoid Arthritis.  If testing is negative, recommend pt consider comprehensive elimination diet and copy given to her.  -     C-Reactive Protein  -     HM2(CBC w/o Differential)  -     Rheumatoid Factor Quant  -     Erythrocyte Sedimentation Rate  -     Comprehensive Metabolic Panel    Visit for screening mammogram  -     Mammo Screening Bilateral; Future    Screen for colon cancer  -     Ambulatory referral for Colonoscopy    Vitamin D  deficiency  -     Vitamin D, Total (25-Hydroxy)    Donita Raygoza

## 2021-06-04 NOTE — TELEPHONE ENCOUNTER
Left message. Please schedule appointment for pt on one of Dr. Raygoza reserved spots on 12/13/19.

## 2021-06-04 NOTE — TELEPHONE ENCOUNTER
Left message. Please relay MD's message and schedule appointment for pt on reserved spot with Dr. Raygoza next week. xl

## 2021-06-05 VITALS
HEART RATE: 67 BPM | SYSTOLIC BLOOD PRESSURE: 102 MMHG | BODY MASS INDEX: 22.71 KG/M2 | OXYGEN SATURATION: 99 % | DIASTOLIC BLOOD PRESSURE: 70 MMHG | WEIGHT: 115.31 LBS | TEMPERATURE: 97.7 F
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
## 2021-06-08 NOTE — PROGRESS NOTES
"Xena Ribera is a 49 y.o. female who is being evaluated via a billable video visit.      The patient has been notified of following:     \"This video visit will be conducted via a call between you and your physician/provider. We have found that certain health care needs can be provided without the need for an in-person physical exam.  This service lets us provide the care you need with a video conversation.  If a prescription is necessary we can send it directly to your pharmacy.  If lab work is needed we can place an order for that and you can then stop by our lab to have the test done at a later time.    Video visits are billed at different rates depending on your insurance coverage. Please reach out to your insurance provider with any questions.    If during the course of the call the physician/provider feels a video visit is not appropriate, you will not be charged for this service.\"    Patient has given verbal consent to a Video visit? Yes    Patient would like to receive their AVS by AVS Preference: Truong.    Patient would like the video invitation sent by: Text to cell phone: [phone]    Will anyone else be joining your video visit? No        Video Start Time: 1:06 PM    Additional provider notes:     Assessment/Plan:      Contusion of right thigh, initial encounter  Patient has no other concerning bruising, no obvious varicose veins that would be the cause of a superficial clot.  This area is not tender or raised.  Discussed watching out for any further bruising or bleeding issues like nosebleeds or bleeding gums.  In the absence of these, I would treat this current bruise with observation only.  She will keep me posted if other bruising or bleeding issues arise.        Subjective:       Xena Ribera is a 49 y.o. female who presents for a discussion of a new bruise on her right outer thigh.  Patient states she woke with this large bruise and cannot remember any trauma to the area.  She called " nurse triage and was advised to have a visit with a provider to discuss this.  The area can be slightly tender with deep palpation, but is not tender to superficial palpation.  The bruise initially measured 8 x 4 inches.  She states this was initially a reddish color, now has transitioned to more of a purple splotchy color.  She notes no bruising elsewhere, no bleeding gums, no nosebleeds.  She has had no excess fatigue.  Of note, she did have labs done in December showing normal LFTs and a normal hemogram along with inflammatory markers.  She has never had an issue with bruising in the past.  Her mother does have a history of blood clots of unknown etiology she states.  Patient herself has no swelling of the lower extremities.  She has been working from home and sitting for work, but otherwise has not been extremely sedentary.  She herself has never had a blood clot.    Labs Reviewed:  Lab Results   Component Value Date    WBC 4.8 12/13/2019    HGB 14.3 12/13/2019    HCT 42.9 12/13/2019     12/13/2019    ALT 13 12/13/2019    AST 17 12/13/2019     12/13/2019    K 3.9 12/13/2019     12/13/2019    CREATININE 0.69 12/13/2019    BUN 12 12/13/2019    CO2 26 12/13/2019       The following portions of the patient's history were reviewed and updated as appropriate: allergies, current medications, past family history, past medical history and problem list.    No current outpatient medications on file.    Review of Systems   Pertinent items are noted in HPI.      Objective:      Wt 138 lb (62.6 kg) Comment: patient reported  Breastfeeding No   BMI 27.18 kg/m      General appearance: alert, appears stated age and cooperative  Head: Normocephalic, without obvious abnormality, atraumatic  Eyes: conjunctivae clear, sclerae anicteric  Skin: purple splotchy resolving bruise right lateral thigh, no erythema, no swelling   Extremities: No visible edema in the right lower extremity, no obvious varicose  veins  Neurologic: Grossly normal, alert and oriented x3, good historian              Video-Visit Details    Type of service:  Video Visit    Video End Time (time video stopped): 1:16 PM  Originating Location (pt. Location): Home    Distant Location (provider location):  [facility]     Platform used for Video Visit: Benita Grey MD

## 2021-06-08 NOTE — TELEPHONE ENCOUNTER
After hanging up with the patient I was able to find a more accurate protocol for her symptoms. I used the bruising protocol which recommended patient have a visit with provider any time in the next 3 days to rule out any bleeding disorders since the bruise has sudden onset with unknown cause. I called back the patient to give this recommendation. She did not answer but I left a voicemail stating that I recommend that she have telephone or virtual visit with provider any time in the next 3 days about her bruise. Advised her to call back so we can set up that appointment for her.    Jacqueline Rod RN

## 2021-06-08 NOTE — TELEPHONE ENCOUNTER
Patient calls today because she noticed a new bruise on her right thigh this morning. The bruise is right outer thigh and about [id number]. Reports that bruise is reddish/purple. She denies hitting or injuring her leg recently. Unsure of what the cause of the bruise is. She does not take blood thinners. Patient states she's been stationary lately working from home. Asks about blood clot. Denies hx of blood clots but states that her mother has had blood clots before and wonders if it is hereditary. She denies any swelling to the bruise. Denies swelling of lower extremities. Denies numbness, tingling, or loss of sensation. Denies discoloration of legs or feet. Denies cool extremities.     Patient does not have concerning symptoms of blood clot at this time. I recommended she monitor the bruise for a few days. It should start to fade on its own. It may take a few days before she notices fading in the bruising. It can take 10-14 days for the bruise to completely heal or fade. The patient should call back if she does not notice healing of the bruise within the next 3 days. She should call back sooner if she develops new or worsening symptoms, such as, additional bruising in new areas of the body, any swelling, discoloration, or numbness/tingling in the extremities, breathing difficulty, or fevers. Patient can use ice packs on the area to help with bruising. Recommended to avoid any blood thinning medications such as aspirin or ibuprofen. Verbalizes understanding of nurse advice.    Jacqueline Rod RN      Reason for Disposition    [1] Not caused by an injury AND [2] < 5 unexplained bruises    Protocols used: BRUISES-A-AH

## 2021-06-09 NOTE — PROGRESS NOTES
Assessment/Plan:       1. Cyst of skin  This is tiny, <1 cm in size, somewhat mobile in a small space medial to the lower aspect of the patella.  Given it's mobility and superficial nature, discussed that this is likely a benign cyst, possibly a synovial cyst.  This would be difficult to drain given its tiny size.  If this grows or becomes bothersome, patient will let me know at which point would consider a visit with Orthopedics to see if they would try to drain this vs image with ultrasound.    2. Screening for malignant neoplasm of cervix  Patient is unsure when or where her last pap smear was performed.  This was updated today.  - Gynecologic Cytology (PAP Smear)    3. Screening for colon cancer  After a discussion of options, patient wishes to try Cologuard for colon cancer screening.  She appears low risk and eligible for this type of screening.  - Cologuard    4. Lipid screening  Random lipid profile updated today.  Patient does not follow up regularly for routine physicals and had not had this screened previously.  - Lipid Cascade RANDOM        Subjective:       Xena Ribera is a 49 y.o. female who presents for evaluation of a lump in her right knee.  A couple of weeks ago, she was sitting at her desk with her hand resting on her knee, and when she moved her knee, she happened to feel a tiny lump under the skin that seemed to move when she moved her knee.  This is not painful, and she has noted no similar lumps elsewhere.  It seems mobile within an area adjacent to the kneecap.  Patient was noted to have some care gaps, and is due for colon cancer screening and pap smear along with mammogram.  Mammogram was ordered previously, and patient was given the scheduling number and location options today.  She has no family history of colon cancer.      Labs Reviewed:  Lab Results   Component Value Date    WBC 4.8 12/13/2019    HGB 14.3 12/13/2019    HCT 42.9 12/13/2019     12/13/2019    ALT 13  "12/13/2019    AST 17 12/13/2019     12/13/2019    K 3.9 12/13/2019     12/13/2019    CREATININE 0.69 12/13/2019    BUN 12 12/13/2019    CO2 26 12/13/2019       The following portions of the patient's history were reviewed and updated as appropriate: allergies, current medications, past family history, past medical history, past surgical history and problem list.    No current outpatient medications on file.    Review of Systems   A 12 point comprehensive review of systems was negative except as noted.      Objective:      /61 (Patient Site: Left Arm, Patient Position: Sitting, Cuff Size: Adult Regular)   Pulse 74   Ht 4' 11.75\" (1.518 m)   Wt 126 lb (57.2 kg)   Breastfeeding No   BMI 24.81 kg/m      General appearance: alert, appears stated age and cooperative  Head: Normocephalic, without obvious abnormality, atraumatic  Eyes: conjunctivae clear, sclerae anicteric  Pelvic: cervix normal in appearance, external genitalia normal, no adnexal masses or tenderness, no cervical motion tenderness, rectovaginal septum normal, uterus normal size, shape, and consistency and vagina normal without discharge  Extremities: tiny, <1 cm mobile lump palpated just medial to the infrapatellar ligament right knee, palpated best with the knee extended          "

## 2021-06-10 NOTE — TELEPHONE ENCOUNTER
Patient Returning Call  Reason for call:  Call back  Information relayed to patient:  Writer relayed message to Pt: Called patient, no answer.  She did have an identifier on her voicemail.  Left message that I would like to discuss a result with her and asked her to call back at her convenience.     When she calls back, please inform her that her Cologuard colon cancer screening test returned positive.  This does not necessarily mean that she has colon cancer, it could mean that she simply has a noncancerous polyp.  She does need a colonoscopy, however.  If she is willing I will place the order.  If she needs to speak with me, let me know, thanks.    Pt agrees and understands and will await Gastroenterology to contact her to schedule a colonoscopy.    Patient has additional questions:  No  If YES, what are your questions/concerns:  N/A  Okay to leave a detailed message?: No call back needed
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
## 2021-06-10 NOTE — TELEPHONE ENCOUNTER
Called patient, no answer.  She did have an identifier on her voicemail.  Left message that I would like to discuss a result with her and asked her to call back at her convenience.    When she calls back, please inform her that her Cologuard colon cancer screening test returned positive.  This does not necessarily mean that she has colon cancer, it could mean that she simply has a noncancerous polyp.  She does need a colonoscopy, however.  If she is willing I will place the order.  If she needs to speak with me, let me know, thanks.

## 2021-06-11 NOTE — PROGRESS NOTES
HPI: This patient is a 50yo F who presents back to clinic for evaluation of her nose. She states that the skin just on the inside of her nostrils has been dry, sensitive, and cracked for a few months. She had this before, 2 years ago, and we successfully treated it with mupirocin cream. IDenies other symptoms such as congestion, epistaxis, fevers, sore throat, etc. As a separate issue, she also states that she has noticed a little decrease in her hearing in the left ear for some time now. No otorrhea, otalgia, tinnitus, or vertigo.     Past medical history, surgical history, social history, family history, medications, and allergies have been reviewed with the patient and are documented above.     Review of Systems: a 10-system review was performed. Pertinent positives are noted in the HPI and on a separate scanned document in the chart.     PHYSICAL EXAMINATION:  GEN: no acute distress, normocephalic  EYES: extraocular movements are intact, pupils are equal and round. Sclera clear.   EARS: auricles are normally formed. The external auditory canals are clear with minimal cerumen. Tympanic membranes are intact bilaterally with no signs of infection, effusion, retractions, or perforations.  NOSE: anterior nares are patent. There are no masses or lesions. The septum is non-obstructing. Dry, cracked nasal vestibular skin bilaterally, R>L  NECK: soft and supple. No lymphadenopathy or masses. Airway is midline.  NEURO: CN VII and XII symmetric. alert and oriented. No spontaneous nystagmus. Gait is normal.  PULM: breathing comfortably on room air, normal chest expansion with respiration     MEDICAL DECISION-MAKING: Xena is a 48yo F with nasal vestibulitis. Will again Rx mupirocin ointment which should eradicate the problem. No obvious reason for decreased left hearing appreciated on exam. Will arrange for an audiogram and return visit to discuss.

## 2021-06-12 NOTE — PROGRESS NOTES
HPI: This patient is a 50yo F who presents back to clinic for her ears. The nasal vestibulitis has resolved appropriately with the mupirocin ointment. She noted last time that she has had a little decrease in her hearing in the left ear for some time now. No otorrhea, otalgia, tinnitus, or vertigo. No reported left acoustic traumas. Her sister was born with unilateral deafness, unknown etiology as she states that really no testing was ever done.      Past medical history, surgical history, social history, family history, medications, and allergies have been reviewed with the patient and are documented above.     Review of Systems: a 10-system review was performed. Pertinent positives are noted in the HPI and on a separate scanned document in the chart.     PHYSICAL EXAMINATION:  GEN: no acute distress, normocephalic  EYES: pupils are equal and round. Sclera clear.   NEURO: CN VII and XII symmetric. alert and oriented. No spontaneous nystagmus. Gait is normal.  PULM: breathing comfortably on room air, normal chest expansion with respiration    AUDIO: mild sloping SNHL in the left ear, asymmetric from 1kHz on. Symmetric WRS. Type A tymps bilaterally.     MEDICAL DECISION-MAKING: Xena is a 50yo F with asymmetric left SNHL. Discussed investigating this with an MRI to eval the IAC. She will complete this at her convenience and I will contact her with the results. 15 min visit, 100% of the time spent on reviewing her audiogram and determining appropriate workup as stated above.

## 2021-06-13 NOTE — TELEPHONE ENCOUNTER
----- Message from Micheline Stewart MD sent at 11/11/2020  8:39 AM CST -----  Can you contact Xena and notify her that her MRI does not show any abnormalities that are responsible for her asymmetrical hearing loss? Thanks!

## 2021-06-16 NOTE — PROGRESS NOTES
Assessment & Plan   1. Tick bite, initial encounter  Reviewed indications for tick bite prophylaxis, early symptoms of tick born illness and antibody testing.  As the tick was not adhered for 36 hours and it has been >72 hours, did not recommend prophylaxis.  Too early to do antibody testing and has not noted rash consistent with erythema migrans.  At this point, recommended continuing to monitor for symptoms and could consider antibody testing in the future if persistent.     2. Unilateral sensorineural hearing loss  Reviewed ENT visit and normal MRI.  Interested in determining if there is any additional workup that can be done.  Referral for second opinion at the [facility].   - Ambulatory referral to [facility] ([place])    Shelia Montanez CNP      Subjective   Chief Complaint:  Tick bite (x 4 days )    HPI:   Xena Ribera is a 50 y.o. female who presents for tick bite.    She states she noted tick attached to the back of her scalp four days ago.  Unsure how long embedded but feels it was likely less than 24 hours as she would have noticed this with brushing her hair.   is a  and was driving her car before she noticed this.  Over the weekend did note fatigue and HA.  No fever, chills.  Did have a COVID test Friday which was negative. Fully immunized.      Hearing loss:  Unilateral. Workup with ENT last year and normal MRI.  Sensorineural hearing loss. No other neurological symptoms.  Frustrated there is not an answer and wondering about additional workup.     Allergies:  has No Known Allergies.    Review of Systems:  A complete head to toe ROS is negative unless otherwise noted in HPI    Objective     Vitals:    04/05/21 1118   BP: 102/70   Patient Site: Left Arm   Patient Position: Sitting   Cuff Size: Adult Regular   Pulse: 67   Temp: 97.7  F (36.5  C)   TempSrc: Oral   SpO2: 99%   Weight: 115 lb 5 oz (52.3 kg)       Physical Exam:  GENERAL: Alert, well appearing.   SKIN:  Posterior scalp with localized edema, approximately 3-4mm in diameter.  No erythema, warmth.   NECK: No lymphadenopathy

## 2021-06-16 NOTE — PROGRESS NOTES
SUBJECTIVE: Xena Ribera is a 47 y.o. female with:  Chief Complaint   Patient presents with     Vaginitis     order and thick heavy discharge and itching    Her symptoms have been going on for 4-5 days.  No burning but she has itching.  Has yeasty odor.  Discharge is thick.  No pelvic pain.  She is menopausal.  No antibiotics.  Has been eating more sugar.  Not currently sexually active.    Patient Active Problem List   Diagnosis     Polyp of cervix     Low back pain     Obsessive-compulsive disorder      Recent Results (from the past 240 hour(s))   Wet Prep, Vaginal   Result Value Ref Range    Yeast Result Yeast Seen (!) No yeast seen    Trichomonas No Trichomonas seen No Trichomonas seen    Clue Cells, Wet Prep No Clue cells seen No Clue cells seen      Xena was seen today for vaginitis.    Diagnoses and all orders for this visit:    Yeast vaginitis  -     Wet Prep, Vaginal  -     fluconazole (DIFLUCAN) 150 MG tablet; Take 1 tablet (150 mg total) by mouth once for 1 dose. May repeat in 4 days if not completely improved       Patient Instructions   Probiotics are pills containing healthy bacteria that rebalance the natural bacterial rozina in your gut.  They are sold over the counter.  Look for a product with at least 20 billion CFUs (colony forming units) in a serving and containing both lactobacillus and bifidus species.  Keep refrigerated and take in between meals with cool water.      Cut back on sugar/ processed carbohydrates    Donita Raygoza

## 2021-06-17 NOTE — TELEPHONE ENCOUNTER
Telephone Encounter by Katie Cutler LPN at 8/14/2020  1:28 PM     Author: Katie Cutler LPN Service: -- Author Type: Licensed Nurse    Filed: 8/14/2020  1:30 PM Encounter Date: 8/14/2020 Status: Signed    : Katie Cutler LPN (Licensed Nurse)       Patient Returning Call  Reason for call:  Patient returning call   Information relayed to patient:  Radha Grey MD           8/14/20 12:51 PM   Note      Called patient, no answer.  She did have an identifier on her voicemail.  Left message that I would like to discuss a result with her and asked her to call back at her convenience.     When she calls back, please inform her that her Cologuard colon cancer screening test returned positive.  This does not necessarily mean that she has colon cancer, it could mean that she simply has a noncancerous polyp.  She does need a colonoscopy, however.  If she is willing I will place the order.  If she needs to speak with me, let me know, thanks.      Patient has additional questions:  No  If YES, what are your questions/concerns:  Patient agreed for colonoscopy .  Okay to leave a detailed message?: No

## 2021-06-19 NOTE — PROGRESS NOTES
HPI: This patient is a 46yo F who presents to clinic at the request of Dr. Grey for evaluation of her nose. She states that the skin just on the inside of her nostrils has been dry, sensitive, and cracked for about a year. It started on the right and has slowly started to involve the left side also. She has tried to use some coconut oil to help heal the skin, but this has been ineffective thus far. Denies other symptoms such as congestion, epistaxis, fevers, sore throat, etc.    Past medical history, surgical history, social history, family history, medications, and allergies have been reviewed with the patient and are documented above.    Review of Systems: a 10-system review was performed. Pertinent positives are noted in the HPI and on a separate scanned document in the chart.    PHYSICAL EXAMINATION:  GEN: no acute distress, normocephalic  EYES: extraocular movements are intact, pupils are equal and round. Sclera clear.   EARS: auricles are normally formed. The external auditory canals are clear with minimal cerumen. Tympanic membranes are intact bilaterally with no signs of infection, effusion, retractions, or perforations.  NOSE: anterior nares are patent. There are no masses or lesions. The septum is non-obstructing. Dry, cracked nasal vestibular skin bilaterally, R>L  OC/OP: clear, dentition is in good repair. The tongue and palate are fully mobile and symmetric. No masses or lesions.  NECK: soft and supple. No lymphadenopathy or masses. Airway is midline.  NEURO: CN VII and XII symmetric. alert and oriented. No spontaneous nystagmus. Gait is normal.  PULM: breathing comfortably on room air, normal chest expansion with respiration  CARDS: no cyanosis or clubbing, normal carotid pulses    MEDICAL DECISION-MAKING: Xena is a 46yo F with nasal vestibulitis. Will Rx mupirocin ointment which should eradicate the problem.

## 2021-06-20 NOTE — LETTER
Letter by Donita Raygoza MD at      Author: Donita Raygoza MD Service: -- Author Type: --    Filed:  Encounter Date: 1/6/2020 Status: Signed           January 6, 2020        Xena Ribera  [address] [place] [address]        Dear Xena,    Breast cancer is the most common type of cancer among American women. The earlier breast cancer is detected, the better the survival rate. Your best defense against breast cancer is having a screening mammogram on a regular basis. The American Cancer Society recommends that women with an average risk of breast cancer should undergo regular screening mammography starting at age 45 years.         Women aged 45 to 54 years should have a mammogram annually.     Women 55 years and older should have a mammogram at least every other year.     There may be important reasons for you to follow a more frequent screening plan or an earlier start for breast cancer screening, so please discuss your health history and your family health history with your primary care provider.       We reviewed our records and we do not see that you had a mammogram within the past two years. If you have not had a mammogram in the past two years, we strongly encourage you to call for your appointment today. For your convenience, we have included a phone number below for you to schedule your mammogram at a nearby [facility] location.      If you have had a recent mammogram or have questions about why you are receiving this letter, please contact your primary care clinic at [phone] or send a [employer] message  ([email or web address]). Your clinic will answer any questions or help obtain recent mammogram reports for your chart at [facility] so we can keep record of your preventive care.       Sincerely,    Radha Grey MD    and your primary care team at [facility]  Schedule your mammogram today at one of our 7 locations  Please  call  [phone]

## 2021-06-20 NOTE — PROGRESS NOTES
CC: The patient is being seen as a consult from Dr Raygoza secondary to a cervical polyp.    HPI: The pt is a 47 y.o. MWF  who presents with a cervical polyp seen in Feb.  She denies bleeding with it.  She has had a polyp removed at least twice before, once at [facility] in  and once at a clinic in [place], although she can't remember where.  The removal in [place] was very painful, and she thinks it may have been a uterine polyp, but she isn't sure.  She didn't remember having the one in ; it was seen in her records.    Past Medical History:   Diagnosis Date     Migraines      OCD (obsessive compulsive disorder)        Past Surgical History:   Procedure Laterality Date     EYE SURGERY      for amblyopia     STRABISMUS SURGERY       THERAPEUTIC          Patient's   Family History   Problem Relation Age of Onset     Diabetes Father      Heart disease Father 59     MI     Hyperlipidemia Father      Hypertension Father      Diabetes Maternal Grandfather      Cancer Paternal Grandmother      skin     Osteoporosis Mother      Deep vein thrombosis Mother      Breast cancer Neg Hx      Colon cancer Neg Hx        Patient   Social History     Social History     Marital status:      Spouse name: N/A     Number of children: N/A     Years of education: N/A     Social History Main Topics     Smoking status: Never Smoker     Smokeless tobacco: Never Used     Alcohol use No      Comment: sober 20+ years     Drug use: None     Sexual activity: Yes     Partners: Male     Birth control/ protection: Post-menopausal     Other Topics Concern     None     Social History Narrative       Outpatient Medications Prior to Visit   Medication Sig Dispense Refill     mupirocin (BACTROBAN) 2 % ointment Rim the inside of your nose twice daily for 14 days 15 g 0     No facility-administered medications prior to visit.        Patient has No Known Allergies.    ROS:  12 part ROS is negative aside from those  "symptoms in the HPI    PE:  BP 98/66 (Patient Site: Right Arm, Patient Position: Sitting, Cuff Size: Adult Regular)  Pulse 84  Ht 4' 11.75\" (1.518 m)  Wt 143 lb (64.9 kg)  LMP  (LMP Unknown)  Breastfeeding? No  BMI 28.16 kg/m2          Body mass index is 28.16 kg/(m^2).    General: overweight WF, NAD  Pelvic: EG/BUS no lesions, no skin change   Vagina no lesions, no discharge   Cervix no cervical motion tenderness; polyp at external os removed with torsion, pt with cramping   Perineum no lesions   Rectal deferred  Psych: normal mood  Neuro: CN I-XII grossly intact  MS: normal gait    Assessment: 47 y.o. MWF  with a cervical polyp.    Plan: Natural history of polyps discussed with the patient.  We discussed that some people form more polyps than others, and she may be one of those people now that she's had 3 of them.  We discussed that for the future, just because a polyp is seen doesn't mean it has to be removed if she isn't having symptoms from it.  She will be notified when the Pathology results are available.        "

## 2021-06-20 NOTE — LETTER
Letter by Donita Raygoza MD at      Author: Donita Raygoza MD Service: -- Author Type: --    Filed:  Encounter Date: 2/21/2020 Status: (Other)         February 21, 2020     Patient: Xena Ribera   YOB: 1970   Date of Visit: 2/21/2020       To Whom It May Concern:    It is my medical opinion that Xena Ribera should use a sit / stand desk to accomodate her chronic back pain.    If you have any questions or concerns, please don't hesitate to call.    Sincerely,        Electronically signed by Donita Raygoza MD

## 2021-06-29 NOTE — PROGRESS NOTES
"Progress Notes by Sunshine Sherwood AuD at 10/7/2020  9:20 AM     Author: Sunshine Sherwood AuD Service: -- Author Type: Audiologist    Filed: 10/7/2020  9:55 AM Encounter Date: 10/7/2020 Status: Signed    : Sunshine Sherwood AuD (Audiologist)       Audiology Report    Summary: Audiology visit completed. Please see audiogram below or in \"media\" tab for case history and results.     Plan:  The patient is returned to ENT for follow up. Return for retesting with ENT recommendation.     Divine Gallardo, [facility]  Clinical Audiologist  [place] #[id number]           "

## 2021-07-03 NOTE — ADDENDUM NOTE
Addendum Note by Radha Grey MD at 8/15/2020  9:04 PM     Author: Radha Grey MD Service: -- Author Type: Physician    Filed: 8/15/2020  9:04 PM Encounter Date: 8/14/2020 Status: Signed    : Radha Grey MD (Physician)    Addended by: RADHA GREY on: 8/15/2020 09:04 PM        Modules accepted: Orders

## 2021-07-07 ENCOUNTER — COMMUNICATION - HEALTHEAST (OUTPATIENT)
Dept: SCHEDULING | Facility: CLINIC | Age: 51
End: 2021-07-07

## 2021-07-07 NOTE — TELEPHONE ENCOUNTER
Telephone Encounter by Mayda Phan RN at 7/7/2021 12:00 PM     Author: Mayda Phan RN Service: -- Author Type: Registered Nurse    Filed: 7/7/2021 12:15 PM Encounter Date: 7/7/2021 Status: Signed    : Mayda Phan RN (Registered Nurse)       Triage Call:    -Patient states about 2-3 weeks ago patient started noticing scaly, red, flaky redness on the back of patients scalp.   -Patient states it is superficial and denies any deep sores or open sores.   -Patient states she has notices there are mulitple scabs on her head.  -Patient states is mainly located at the scalp of the back of the head.   -No itching.   -No drainage  -No fevers or spreading of redness. No pain and no swelling.   -Per protocol recommendations are for patient to see PCP within 24 hours. Transferred to scheduling and advised patient to call back if she develops any new or worsening sx. Patient verbalized understanding and agrees with plan.     Mayda Phan RN, BSN Nurse Triage Advisor 12:13 PM 7/7/2021     Reason for Disposition  ? [1] Unexplained sores AND [2] 3 or more  ? Localized rash present > 7 days    Additional Information  ? Negative: [1] Red area or streak AND [2] fever  ? Negative: [1] Looks infected (spreading redness, pus) AND [2] large red area (> 2 in. or 5 cm)  ? Negative: [1] Ulcer with black scab AND [2] spreading AND [3] painless AND [4] cause unknown  ? Negative: [1] Red streak runs from sore AND [2] longer than 1 inch (2.5 cm)  ? Negative: [1] Looks infected (spreading redness, pus) AND [2] diabetes mellitus or weak immune system (e.g., HIV positive, cancer chemo, splenectomy, organ transplant, chronic steroids)  ? Negative: Followed an injury (i.e., from an abrasion or scratch)  ? Negative: Wound infection suspected (in traumatic or surgical wound)  ? Negative: Soft yellow scabs (crusts)  ? Negative: Followed a burn  ? Negative: Looks like insect bite  ? Negative: Looks like  chickenpox  ? Negative: On one side of the lip  ? Negative: Looks like poison ivy  ? Negative: Looks like ringworm  ? Negative: Patient sounds very sick or weak to the triager  ? Negative: Sounds like a life-threatening emergency to the triager  ? Negative: Looks infected (e.g., pus, soft scabs, open sores)  ? Negative: Pubic lice  ? Negative: Doesn't match the SYMPTOMS for head lice, AND insect bite suspected  ? Negative: Doesn't match the SYMPTOMS for head lice  ? Negative: [1] More than 24 hours since completing treatment with NIX and Cetaphil AND [2] moving lice are seen in the hair  ? Negative: Head lice or nits recur within 1 month of completing treatment with NIX and Cetaphil  ? Negative: [1] New-onset head lice AND [2] usually respond to OTC meds in community  ? Negative: [1] New-onset head lice AND [2] known resistance to OTC meds in community  ? Negative: [1] Less than 24 hours since completing treatment AND [2] moving lice are seen in the hair  ? Negative: Nix failed to cure head lice  ? Negative: Cleaning the house, questions about  ? Negative: Exposure to head lice, questions about  ? Negative: Diagnosis of lice is uncertain  ? Negative: [1] Small red streak or spreading redness (<2 inches; 5 cm) AND [2] no fever  ? Negative: Possible contact with poison ivy or oak  ? Negative: Insect bite(s) suspected  ? Negative: Athlete's Foot suspected (i.e., itchy rash between the toes)  ? Negative: Jock Itch suspected (i.e., itchy rash on inner thighs near genital area)  ? Negative: Wound infection suspected (i.e., pain, spreading redness, or pus; in a cut, puncture, scrape or sutured wound)  ? Negative: Rash of external female genital area (vulva)  ? Negative: Rash of penis or scrotum  ? Negative: Small spot, skin growth, or mole  ? Negative: Sounds like a life-threatening emergency to the triager  ? Negative: Fever and localized purple or blood-colored spots or dots that are not from injury or friction  ?  Negative: Fever and localized rash is very painful  ? Negative: Patient sounds very sick or weak to the triager  ? Negative: Looks like a boil, infected sore, deep ulcer, or other infected rash (spreading redness, pus)  ? Negative: Localized purple or blood-colored spots or dots that are not from injury or friction (no fever)  ? Negative: Localized rash is very painful (no fever)  ? Negative: Painful rash with multiple small blisters grouped together (i.e., dermatomal distribution or 'band' or 'stripe')  ? Negative: Lyme disease suspected (e.g., bull's-eye rash or tick bite / exposure)  ? Negative: Patient wants to be seen  ? Negative: Applying cream or ointment and it causes severe itch, burning, or pain  ? Negative: Tender bumps in armpits  ? Negative: Pimples (localized) and no improvement after using CARE ADVICE  ? Negative: SEVERE local itching persists after 2 days of steroid cream    Protocols used: SORES-A-AH, RASH OR REDNESS - [id number], HEAD LICE-A-AH    COVID 19 Nurse Triage Plan/Patient Instructions    Please be aware that novel coronavirus (COVID-19) may be circulating in the community. If you develop symptoms such as fever, cough, or SOB or if you have concerns about the presence of another infection including coronavirus (COVID-19), please contact your health care provider or visit  [email or web address].    Disposition/Instructions    Virtual Visit with provider recommended. Reference Visit Selection Guide. and In-Person Visit with provider recommended. Reference Visit Selection Guide.    Thank you for taking steps to prevent the spread of this virus.  o Limit your contact with others.  o Wear a simple mask to cover your cough.  o Wash your hands well and often.    Resources    [facility]: About COVID-19: [email or web address]    CDC: What to Do If You're Sick: www.cdc.gov/coronavirus/2019-ncov/about/steps-when-sick.html    CDC: Ending Home Isolation:  www.cdc.gov/coronavirus/2019-ncov/hcp/disposition-in-home-patients.html     CDC: Caring for Someone: www.cdc.gov/coronavirus/2019-ncov/if-you-are-sick/care-for-someone.html     [facility]: Interim Guidance for Hospital Discharge to Home: [email or web address]/hcp/hospdischarge.pdf    [facility] clinical trials (COVID-19 research studies): [email or web address]-trials     Below are the COVID-19 hotlines at the [place] Department of [facility] ([facility]). Interpreters are available.   o For health questions: Call 835-066-7730 or 1-478.754.5705 (7 a.m. to 7 p.m.)  o For questions about schools and childcare: Call 728-545-9708 or 1-455.951.8268 (7 a.m. to 7 p.m.)

## 2021-07-08 ENCOUNTER — OFFICE VISIT - HEALTHEAST (OUTPATIENT)
Dept: FAMILY MEDICINE | Facility: CLINIC | Age: 51
End: 2021-07-08

## 2021-07-09 ENCOUNTER — COMMUNICATION - HEALTHEAST (OUTPATIENT)
Dept: FAMILY MEDICINE | Facility: CLINIC | Age: 51
End: 2021-07-09

## 2021-07-10 VITALS
TEMPERATURE: 98.8 F | BODY MASS INDEX: 23.73 KG/M2 | RESPIRATION RATE: 12 BRPM | SYSTOLIC BLOOD PRESSURE: 102 MMHG | DIASTOLIC BLOOD PRESSURE: 70 MMHG | HEART RATE: 72 BPM | OXYGEN SATURATION: 97 % | WEIGHT: 117.5 LBS

## 2021-07-15 ENCOUNTER — HOSPITAL ENCOUNTER (EMERGENCY)
Facility: HOSPITAL | Age: 51
Discharge: HOME | End: 2021-07-15
Attending: EMERGENCY MEDICINE | Admitting: EMERGENCY MEDICINE
Payer: COMMERCIAL

## 2021-07-15 ENCOUNTER — NURSE TRIAGE (OUTPATIENT)
Dept: NURSING | Facility: CLINIC | Age: 51
End: 2021-07-15

## 2021-07-15 VITALS
WEIGHT: 117 LBS | DIASTOLIC BLOOD PRESSURE: 56 MMHG | RESPIRATION RATE: 18 BRPM | TEMPERATURE: 98.1 F | OXYGEN SATURATION: 100 % | BODY MASS INDEX: 23.59 KG/M2 | HEIGHT: 59 IN | HEART RATE: 66 BPM | SYSTOLIC BLOOD PRESSURE: 102 MMHG

## 2021-07-15 DIAGNOSIS — H20.9 TRAUMATIC IRITIS: ICD-10-CM

## 2021-07-15 DIAGNOSIS — S05.12XA CONTUSION OF LEFT EYE, INITIAL ENCOUNTER: ICD-10-CM

## 2021-07-15 DIAGNOSIS — S05.12XA PERIORBITAL CONTUSION OF LEFT EYE, INITIAL ENCOUNTER: ICD-10-CM

## 2021-07-15 PROCEDURE — 99283 EMERGENCY DEPT VISIT LOW MDM: CPT

## 2021-07-15 PROCEDURE — 250N000009 HC RX 250: Performed by: EMERGENCY MEDICINE

## 2021-07-15 RX ORDER — PROPARACAINE HYDROCHLORIDE 5 MG/ML
2 SOLUTION/ DROPS OPHTHALMIC ONCE
Status: COMPLETED | OUTPATIENT
Start: 2021-07-15 | End: 2021-07-15

## 2021-07-15 RX ORDER — ERYTHROMYCIN 5 MG/G
OINTMENT OPHTHALMIC ONCE
Status: COMPLETED | OUTPATIENT
Start: 2021-07-15 | End: 2021-07-15

## 2021-07-15 RX ADMIN — ERYTHROMYCIN 1 G: 5 OINTMENT OPHTHALMIC at 21:54

## 2021-07-15 RX ADMIN — PROPARACAINE HYDROCHLORIDE 2 DROP: 5 SOLUTION/ DROPS OPHTHALMIC at 21:50

## 2021-07-15 RX ADMIN — FLUORESCEIN SODIUM 600 MCG: 1 STRIP OPHTHALMIC at 21:49

## 2021-07-15 ASSESSMENT — MIFFLIN-ST. JEOR: SCORE: 1051.34

## 2021-07-15 ASSESSMENT — VISUAL ACUITY
OS: 20/20
OD: 20/20

## 2021-07-15 ASSESSMENT — ENCOUNTER SYMPTOMS: PHOTOPHOBIA: 0

## 2021-07-15 NOTE — TELEPHONE ENCOUNTER
Patient had a full bottle of shampoo fall and hit her next to her left eye. Patient has been icing her eye and now noticed that the vision in that eye is now blurry. There is redness and swelling between the eye and the bridge of her nose.   Protocol directing to ED Now for evaluation. Patient will call  to come drive her to [facility] ED.   Danielle Beckman RN   07/15/21 6:40 PM  [facility] Nurse Advisor    Reason for Disposition    Vision is blurred or lost in either eye    Additional Information    Negative: [1] Major bleeding (e.g., actively dripping or spurting) AND [2] can't be stopped    Negative: Knocked out (unconscious) > 1 minute    Negative: Difficult to awaken or acting confused (e.g., disoriented, slurred speech)    Negative: Severe neck pain    Negative: Sounds like a life-threatening emergency to the triager    Negative: Wound looks infected    Negative: Foreign body in the eye    Negative: Head injury is the primary problem    Negative: Neck injury is the primary problem    Protocols used: EYE INJURY-A-       Other Specify

## 2021-07-15 NOTE — TELEPHONE ENCOUNTER
Reason for Disposition    Eye swelling, bruise or pain    Additional Information    Negative: Vision is blurred or lost in either eye    Negative: Double vision or unable to look upwards    Negative: Cut on eyelid or eyeball (Exception: superficial scratch on eyelid)    Negative: [1] Bleeding AND [2] won't stop after 10 minutes of direct pressure (using correct technique)    Negative: Skin is split open or gaping (or length > 1/4 inch or 6 mm)    Negative: Bloody or cloudy fluid behind the cornea (clear part)    Negative: Sharp object hit the eye (e.g., metallic chip or flying glass)    Negative: Object hit the eye at high speed  (e.g., paintball, BB, metal from nail hammering, something thrown from a )    Negative: Two black eyes (bilateral)    Negative: Sounds like a serious injury to the triager    Negative: [1] SEVERE pain AND [2] not improved 2 hours after pain medicine/ice packs    Negative: [1] Eye pain AND [2] light increases pain    Negative: Flashes of light or floaters in the eye    Negative: Constant tearing or blinking    Negative: Patient keeps the eye covered or refuses to open it    Negative: Suspicious history for the injury    Negative: Patient is confused or is an unreliable provider of information (e.g., dementia, severe intellectual disability, alcohol intoxication)    Negative: Eyelids swollen shut    Negative: Large swelling or bruise (>2 inches or 5 cm)    Negative: Scratch on white of the eye (sclera)    Negative: [1] No prior tetanus shots (or is not fully vaccinated) AND [2] any wound (e.g., cut, scrape)    Negative: [1] HIV positive or severe immunodeficiency (severely weak immune system) AND [2] DIRTY cut or scrape    Negative: [1] Last tetanus shot > 5 years ago AND [2] DIRTY cut or scrape    Negative: [1] Last tetanus shot > 10 years ago AND [2] CLEAN cut or scrape (e.g., object AND skin were clean)    Negative: [1] After 72 hours AND [2] pain not improving    Negative:  "[1] Eye pain or swelling AND [2] present > 7 days    Protocols used: EYE INJURY-A-AH    \"I had called there([facility]) earlier today see epic. I had a bottle of conditioner fall and hit my left eye this morning. It's bruised/purple, no pain. It's a little puffy.\"   Denies other sx   Triaged and gave home care advice   Call back if needed.  "

## 2021-07-15 NOTE — TELEPHONE ENCOUNTER
"Pt reports bottle of conditioner fell on her L eye while bathing this morning and \"bruising and head hurts\". Pt reports bruise and mild \"puffiness\" under eye and slightly on upper eyelid Bruise approximately one inch by one inch underneath eye. Eye itself \"looks fine, vision is fine\". Pt states \"doesn't hurt\". Pt states she is able to open and close the eye fully.     See Care Advice and call back protocol reviewed with pt below.     Pt verbalizes understanding and agrees to plan.     Reason for Disposition    Minor eye injury    Additional Information    Negative: Knocked out (unconscious) > 1 minute    Negative: Sounds like a life-threatening emergency to the triager    Negative: Wound looks infected    Negative: Foreign body in the eye    Negative: Head injury is main concern    Negative: Vision is blurred or lost in either eye    Negative: Double vision or unable to look upwards    Negative: Foreign body (FB) hit eye at high speed (e.g., small metallic chip from hammering, lawnmower, BB gun, explosion)    Negative: Bloody or cloudy fluid behind the cornea (clear part)    Negative: SEVERE pain (e.g., excruciating)    Negative: Sharp object hit the eye (e.g., metallic chip or flying glass)    Negative: Any cut on the eyelid or eyeball    Negative: Skin is split open or gaping (length > 1/4 inch or 6 mm)    Negative: Bleeding won't stop after 10 minutes of direct pressure (using correct technique)    Negative: Two black eyes (both sides)    Negative: Sounds like a serious injury to the triager    Negative: Flashes of light or floaters in the eye    Negative: Constant tearing or blinking    Negative: Patient keeps the eye covered or refuses to open it    Negative: Large swelling or bruise (> 2 inches or 5 cm)    Negative: Eyelids swollen shut    Negative: Scratch on white of the eye (sclera)    Negative: No prior tetanus shots (or is not fully vaccinated) and any wound (e.g., cut or scrape)    Negative: HIV positive " or severe immunodeficiency (severely weak immune system) and DIRTY cut or scrape    Negative: Patient wants to be seen    Negative: Last tetanus shot > 5 years ago and DIRTY cut or scrape    Negative: Last tetanus shot >10 years ago and CLEAN cut or scrape    Negative: Suspicious history for the injury    Negative: Injury and pain has not improved after 3 days    Negative: Pain or swelling persisting > 7 days    Protocols used: EYE INJURY-A-[place]

## 2021-07-16 NOTE — ED PROVIDER NOTES
NAME: Xena Ribera  AGE: 50 year old female  YOB: 1970  MRN: [id number]  EVALUATION DATE & TIME: 7/15/2021  8:39 PM    PCP: Radha Grey    ED PROVIDER: Benny Hylton M.D.      Chief Complaint   Patient presents with     Eye Injury     FINAL IMPRESSION:  1. Periorbital contusion of left eye, initial encounter    2. Traumatic iritis    3. Contusion of left eye, initial encounter      MEDICAL DECISION MAKIN:15 PM I met with the patient, obtained history, performed an initial exam, and discussed options and plan for diagnostics and treatment here in the ED.   9:22 PM I did Wood's lamp exam  9:33 PM  We discussed the plan for discharge and the patient is agreeable. Reviewed supportive cares, symptomatic treatment, outpatient follow up, and reasons to return to the Emergency Department. Patient to be discharged by ED RN.     Patient was clinically assessed and consented to treatment. After assessment, medical decision making and workup were discussed with the patient. The patient was agreeable to plan for testing, workup, and treatment.  Xena Ribera is a 50 year old female who presents today for eye injury. Differential diagnosis includes but not limited to orbital fracture, soft conjunctival hemorrhage, traumatic iritis, globe rupture, orbital hematoma, tear duct obstruction.  Patient with injury to her left eye when a shampoo bottle fell on it.  She does have some bruising around it medially but no tenderness over the bony aspects of the orbit.  There is no sign of laceration or injury to the tear duct and no tearing does appear to be intact.  Patient does report a small amount of blurry vision and his acuity was 20/40 in her right eye and 20/50 in her left.  With minimal visual findings likely traumatic iritis.  She has no eye pain itself and unlikely glaucoma related to the trauma.  There is no findings of hemorrhage on funduscopic exam.  Coronary exam revealed very faint  uptake on the lower portion of the cornea which could be more of a contusion or slight abrasion from the trauma.  On closing her eye there is no acute tenderness I feel likely the blurry vision is related to traumatic iritis.  Patient given erythromycin ointment to help with lubrication and will be plan for outpatient treatment for likely corneal contusion and very slight abrasion.  Following reassurance patient will be discharged home with follow-up to [facility] as needed.  Patient given instructions for close follow-up if any worsening or not improving after initial trauma improves.  Pertinent Labs & Imaging studies reviewed. (See chart for details)           The importance of close follow up was discussed. We reviewed warning signs and symptoms, and I instructed Ms. Ribera to return to the emergency department immediately if she develops any new or worsening symptoms. I provided additional verbal discharge instructions. Ms. Ribera expressed understanding and agreement with this plan of care, her questions were answered, and she was discharged in stable condition.       0 minutes of critical care time    MEDICATIONS GIVEN IN THE EMERGENCY:  Medications   proparacaine (ALCAINE) 0.5 % ophthalmic solution 2 drop (2 drops Left Eye Given 7/15/21 2150)   fluorescein (FUL-NADEEN) ophthalmic strip STRP 600 mcg (600 mcg Left Eye Given 7/15/21 2149)   erythromycin (ROMYCIN) ophthalmic ointment (1 g Left Eye Given 7/15/21 2154)       NEW PRESCRIPTIONS STARTED AT TODAY'S ER VISIT:  Discharge Medication List as of 7/15/2021  9:45 PM             =================================================================    HPI    Patient information was obtained from: Patient    Use of : N/A       Xena FARIA Bacilio is a 50 year old female with a past medical history of migraines, who presents for evaluation of an eye injury.     The patient reports she was showering earlier today when a shampoo bottle fell and hit her left eye.   "She is unsure if she blinked during the incident.  She only noted irritation when she would open her mouth to yawn, but she noticed blurry vision when she was trying to read subtitles on TV this evening.  She also noticed \"a bubble\" near her left tear duct following the incident but no significant pain.  She denies any pain around her eye or to her nose.  No photophobia.  No other complaints at this time.            REVIEW OF SYSTEMS   Review of Systems   HENT:        No pain to nose or around left eye   Eyes: Negative for photophobia.        Positive for blurry vision to left eye  Positive for irritation to left eye when yawning   All other systems reviewed and are negative.     PAST MEDICAL HISTORY:  Past Medical History:   Diagnosis Date     Migraines      NO ACTIVE PROBLEMS      OCD (obsessive compulsive disorder)      OCD (obsessive compulsive disorder)        PAST SURGICAL HISTORY:  Past Surgical History:   Procedure Laterality Date     EYE SURGERY      right eye     EYE SURGERY      for amblyopia     STRABISMUS SURGERY       THERAPEUTIC        ZZC NONSPECIFIC PROCEDURE      lazy eye       CURRENT MEDICATIONS:    No current facility-administered medications for this encounter.    Current Outpatient Medications:      clobetasoL (TEMOVATE) 0.05 % Gel, [CLOBETASOL (TEMOVATE) 0.05 % GEL] Apply to affected area twice daily on back on neck, Disp: 60 each, Rfl: 2    ALLERGIES:  No Known Allergies    FAMILY HISTORY:  Family History   Problem Relation Age of Onset     Diabetes Father      Alzheimer Disease Maternal Grandmother         late onset     Arthritis Mother         osteo     Arthritis Maternal Grandmother      Gastrointestinal Disease Father         PUD     Heart Disease Father 59.00        MI     Heart Disease Maternal Grandfather         MI 72     Cancer Paternal Grandmother         skin     Hyperlipidemia Father      Hypertension Father      Diabetes Maternal Grandfather      Osteoporosis Mother  "     Deep Vein Thrombosis Mother      Breast Cancer No family hx of      Colon Cancer No family hx of        SOCIAL HISTORY:   Social History     Socioeconomic History     Marital status:      Spouse name: Not on file     Number of children: Not on file     Years of education: Not on file     Highest education level: Not on file   Occupational History     Not on file   Tobacco Use     Smoking status: Never Smoker     Smokeless tobacco: Never Used   Substance and Sexual Activity     Alcohol use: No     Comment: 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 No   Social History Narrative    Balanced Diet - Yes    Osteoporosis Prevention Measures - Dairy servings per day: none    Regular Exercise -  No Describe no    Dental Exam - NO    Eye Exam - NO    Self Breast Exam - No    Abuse: Current or Past (Physical, Sexual or Emotional)- No    Do you feel safe in your environment - Yes    Guns stored in the home - No    Sunscreen used - Yes    Seatbelts used - Yes    Lipids -  NO    Glucose -  NO    Colon Cancer Screening - No    Hemoccults - NO    Pap Test -  NO    Do you have any concerns about STD's -  No    Mammography - NO    DEXA - NO    Immunizations reviewed and up to date - Yes 2000.Bradley Paiz MA                 Social Determinants of Health     Financial Resource Strain:      Difficulty of Paying Living Expenses:    Food Insecurity:      Worried About Running Out of Food in the Last Year:      Ran Out of Food in the Last Year:    Transportation Needs:      Lack of Transportation (Medical):      Lack of Transportation (Non-Medical):    Physical Activity:      Days of Exercise per Week:      Minutes of Exercise per Session:    Stress:      Feeling of Stress :    Social Connections:      Frequency of Communication with Friends  "and Family:      Frequency of Social Gatherings with Friends and Family:      Attends Ten[employer] Services:      Active Member of Clubs or Organizations:      Attends Club or Organization Meetings:      Marital Status:    Intimate Partner Violence:      Fear of Current or Ex-Partner:      Emotionally Abused:      Physically Abused:      Sexually Abused:        PHYSICAL EXAM:    Vitals: /56   Pulse 66   Temp 98.1  F (36.7  C) (Oral)   Resp 18   Ht 1.499 m (4' 11\")   Wt 53.1 kg (117 lb)   SpO2 100%   BMI 23.63 kg/m     Physical Exam  Vitals and nursing note reviewed.   Constitutional:       General: She is not in acute distress.     Appearance: Normal appearance. She is normal weight.   HENT:      Head: Normocephalic. Contusion present. No abrasion, masses, left periorbital erythema or laceration.      Nose: Nose normal.      Mouth/Throat:      Mouth: Mucous membranes are moist.      Pharynx: Oropharynx is clear.   Eyes:      General: Lids are normal.         Left eye: No foreign body or discharge.      Extraocular Movements: Extraocular movements intact.      Left eye: Normal extraocular motion and no nystagmus.      Conjunctiva/sclera: Conjunctivae normal.      Left eye: Left conjunctiva is not injected. No chemosis, exudate or hemorrhage.     Pupils: Pupils are equal, round, and reactive to light.      Right eye: Pupil is reactive. Priti exam negative.      Left eye: Pupil is reactive. Fluorescein uptake present. Priti exam negative.     Funduscopic exam:     Right eye: No hemorrhage.         Left eye: No hemorrhage.      Slit lamp exam:     Right eye: No corneal flare, corneal ulcer or foreign body.      Left eye: No corneal flare, corneal ulcer or foreign body.     Pulmonary:      Effort: No respiratory distress.   Musculoskeletal:      Cervical back: Normal range of motion and neck supple.   Skin:     General: Skin is warm and dry.      Findings: No erythema.   Neurological:      Mental Status: She " is alert. Mental status is at baseline.   Psychiatric:         Mood and Affect: Mood normal.           LAB:  All pertinent labs reviewed and interpreted.  Labs Ordered and Resulted from Time of ED Arrival Up to the Time of Departure from the ED - No data to display    RADIOLOGY:  No orders to display     PROCEDURES:   Procedures   PROCEDURE: Wood's lamp   INDICATIONS: Left eye trauma   PROCEDURE PROVIDER: Dr. Hylton   SITE: Left eye   MEDICATION: Fluorescein   NOTE: Left eye: No corneal abrasion seen   COMPLICATIONS:  None         I, Timothy Sprague, am serving as a scribe to document services personally performed by Dr. Benny Hylton  based on my observation and the provider's statements to me. I, Benny Hylton MD attest that Timothy Sprague is acting in a scribe capacity, has observed my performance of the services and has documented them in accordance with my direction.      Benny Hylton M.D.  Emergency Medicine  [facility] EMERGENCY DEPARTMENT  [facility] [facility] [address]  [phone]  Dept: [phone]      Benny Hylton MD  07/16/21 0528

## 2021-07-16 NOTE — ED TRIAGE NOTES
"Pt was taking a shower when \"shampoo bottle fell down hitting my left eye at 1130. Noted my vision was blurry in that eye when tried to read subtitles on TV this brady\".  "

## 2021-07-16 NOTE — DISCHARGE INSTRUCTIONS
As discussed in ER recommend ice for 20 minutes done with swelling intermittently and monitoring of blurriness as well as tearing.  Recommend erythromycin 3 times daily to help prevent infection and lubricate the eye and the corneal injury.  Recommend follow-up with ophthalmology in 24 to 48 hours if blurriness not improving or increased tearing and concern for tear duct injury over the next 24 hours.

## 2021-07-22 NOTE — LETTER
Letter by Genna Saeed MD at      Author: Genna Saeed MD Service: -- Author Type: --    Filed:  Encounter Date: 2021 Status: (Other)         Xena Ribera  [address] [place] [address]      2021      Dear Xena Ribera,   : 1970      This letter is in regards to the appointment that you had scheduled on 2021 at the [facility] with Dr. Saeed.     The [facility] strives to see all patients in a timely manner and we need your help to achieve this.  The above-mentioned appointment was missed and we do not have record of a cancellation by you.  Whenever possible, we request appointment cancellations at least 72 hours in advance.  This time allows us to offer the appointment to another patient in need.      If you feel you have received this letter in error, or if you need to reschedule this appointment, please call our office so that we may update our records.      Sincerely,    [facility]

## 2021-09-18 ENCOUNTER — HEALTH MAINTENANCE LETTER (OUTPATIENT)
Age: 51
End: 2021-09-18

## 2022-01-08 ENCOUNTER — HEALTH MAINTENANCE LETTER (OUTPATIENT)
Age: 52
End: 2022-01-08

## 2022-11-19 ENCOUNTER — HEALTH MAINTENANCE LETTER (OUTPATIENT)
Age: 52
End: 2022-11-19

## 2023-04-15 ENCOUNTER — HEALTH MAINTENANCE LETTER (OUTPATIENT)
Age: 53
End: 2023-04-15